# Patient Record
Sex: FEMALE | Race: OTHER | Employment: FULL TIME | URBAN - METROPOLITAN AREA
[De-identification: names, ages, dates, MRNs, and addresses within clinical notes are randomized per-mention and may not be internally consistent; named-entity substitution may affect disease eponyms.]

---

## 2018-12-21 ENCOUNTER — OFFICE VISIT (OUTPATIENT)
Dept: INTERNAL MEDICINE CLINIC | Facility: CLINIC | Age: 43
End: 2018-12-21
Payer: COMMERCIAL

## 2018-12-21 VITALS
RESPIRATION RATE: 16 BRPM | DIASTOLIC BLOOD PRESSURE: 71 MMHG | HEIGHT: 66 IN | HEART RATE: 73 BPM | SYSTOLIC BLOOD PRESSURE: 109 MMHG | WEIGHT: 147 LBS | BODY MASS INDEX: 23.63 KG/M2

## 2018-12-21 DIAGNOSIS — Z12.31 VISIT FOR SCREENING MAMMOGRAM: ICD-10-CM

## 2018-12-21 DIAGNOSIS — M54.12 CERVICAL RADICULOPATHY: ICD-10-CM

## 2018-12-21 DIAGNOSIS — E55.9 VITAMIN D DEFICIENCY: ICD-10-CM

## 2018-12-21 DIAGNOSIS — Z00.00 ENCOUNTER FOR MEDICAL EXAMINATION TO ESTABLISH CARE: ICD-10-CM

## 2018-12-21 DIAGNOSIS — Z00.00 ROUTINE PHYSICAL EXAMINATION: ICD-10-CM

## 2018-12-21 DIAGNOSIS — D56.3 THALASSEMIA TRAIT: Primary | ICD-10-CM

## 2018-12-21 DIAGNOSIS — D64.9 ANEMIA, UNSPECIFIED TYPE: ICD-10-CM

## 2018-12-21 PROCEDURE — 99205 OFFICE O/P NEW HI 60 MIN: CPT | Performed by: INTERNAL MEDICINE

## 2018-12-21 PROCEDURE — 99212 OFFICE O/P EST SF 10 MIN: CPT | Performed by: INTERNAL MEDICINE

## 2018-12-21 RX ORDER — LORATADINE 10 MG/1
10 TABLET ORAL DAILY
COMMUNITY
End: 2022-01-19

## 2018-12-21 RX ORDER — CHOLECALCIFEROL (VITAMIN D3) 50 MCG
TABLET ORAL
COMMUNITY

## 2018-12-21 NOTE — ASSESSMENT & PLAN NOTE
Anemia– due to iron deficiency from menstrual losses as well as thalassemia trait. Has been stable without any significant problems. Takes iron supplements on an as-needed basis. Recheck labs ordered .

## 2018-12-21 NOTE — ASSESSMENT & PLAN NOTE
History of herniated disks C5-6, C6-7. History of paresthesia bilateral upper limbs, radicular pain into the arms, neck and posterior head. Patient has been managed on steroid taper packs, tramadol and Norco as needed for flareups.   Has had about 2-3 fla

## 2018-12-21 NOTE — ASSESSMENT & PLAN NOTE
New patient, transfer of care from [facility].  Records being requested today. Immunization records pending. Pap smear done about a year back–records pending. History of cervical radiculopathy from large herniated disc–5 6/6 7.   Old records being re

## 2018-12-21 NOTE — PROGRESS NOTES
HPI:    Patient ID: Ardine Seip is a 44year old female. New pt  PCP  Dr Philip Christensen  Diagnosed with cervical disc [id number]  Managed. -  Off and on flare ups treated with steroid packs-usually with exercise. Last neck MRI -1-2 yrs. mahesh 12/21/18  1554   BP: 109/71   Pulse: 73   Resp: 16     Body mass index is 23.73 kg/m². PHYSICAL EXAM:   Physical Exam   Constitutional: She is oriented to person, place, and time. She appears well-developed and well-nourished.    HENT:   Right Ear: Exter deficiency from menstrual losses as well as thalassemia trait. Has been stable without any significant problems. Takes iron supplements on an as-needed basis. Recheck labs ordered .          Relevant Medications    Multiple Vitamin (MULTI-VITAMIN) Oral T SCREENING BILAT (CPT=77067)          Return in about 6 weeks (around 2/1/2019), or if symptoms worsen or fail to improve.     PT UNDERSTANDS AND AGREES TO FOLLOW DIRECTIONS AND ADVICE    Orders Placed This Encounter      CBC W Differential W Platelet [E]

## 2018-12-21 NOTE — PATIENT INSTRUCTIONS
Problem List Items Addressed This Visit        Unprioritized    Anemia     Anemia– due to iron deficiency from menstrual losses as well as thalassemia trait. Has been stable without any significant problems. Takes iron supplements on an as-needed basis. URINALYSIS, ROUTINE    VITAMIN B12    VITAMIN D, 25-HYDROXY    Visit for screening mammogram        Relevant Orders    AZRA SCREENING BILAT (CPT=77067)

## 2019-01-09 ENCOUNTER — TELEPHONE (OUTPATIENT)
Dept: INTERNAL MEDICINE CLINIC | Facility: CLINIC | Age: 44
End: 2019-01-09

## 2019-01-09 NOTE — TELEPHONE ENCOUNTER
Left message for patient that we do not have this medication on her list.  Suggested that she call back to schedule an office visit to be evaluated for her back pain.

## 2019-01-10 NOTE — TELEPHONE ENCOUNTER
Pt called in requesting to come in this week to discuss medications. [facility] is it ok to book pt in the RN approval slot at 10:30 am on Saturday 1/11?     Please reply to pool: FAIZA Mckinley

## 2019-11-29 ENCOUNTER — TELEPHONE (OUTPATIENT)
Dept: INTERNAL MEDICINE CLINIC | Facility: CLINIC | Age: 44
End: 2019-11-29

## 2019-11-29 DIAGNOSIS — R92.8 ABNORMALITY OF LEFT BREAST ON SCREENING MAMMOGRAM: Primary | ICD-10-CM

## 2019-11-29 NOTE — TELEPHONE ENCOUNTER
Collette, RN from [employer] she's been faxing an order request regarding patient's abnormal mammogram screening, three times since 10/31. Patient need this mammogram order done as soon as possible.     Amado Gale has been faxing to [phone] - i

## 2019-11-29 NOTE — TELEPHONE ENCOUNTER
The order for additional views has been generated left breast-please fax to her,please check if that is the correct one and send

## 2019-12-02 NOTE — TELEPHONE ENCOUNTER
Collette calling to confirm it is Left breast that she needs an order for. Please fax order to her at [phone].

## 2019-12-02 NOTE — TELEPHONE ENCOUNTER
Left message for Collette confirming that it is the left breast.  Once we know it is left we can fax order over to her.

## 2019-12-17 ENCOUNTER — TELEPHONE (OUTPATIENT)
Dept: INTERNAL MEDICINE CLINIC | Facility: CLINIC | Age: 44
End: 2019-12-17

## 2019-12-17 NOTE — TELEPHONE ENCOUNTER
Patient is calling because she faxed over a physical form yesterday to be completed. I checked with [facility]  and they did not receive anything. Patient is going to refax over it again today.  But she will like a call back once form is re

## 2019-12-17 NOTE — TELEPHONE ENCOUNTER
Received faxed Confirmation of Health Status form from patient. Please complete and phone patient once complete. Thank you.

## 2019-12-18 NOTE — TELEPHONE ENCOUNTER
Form placed on [facility]'s [facility] desk for review/signature. ([facility] - please note - form is asking for date of last px which was 12/21/18).

## 2019-12-18 NOTE — TELEPHONE ENCOUNTER
Jian Estrella, [id number] - notifying pt that forms have been completed/signed. Need to ask pt whether she would like to p/u originals or whether she would like us to fax them somewhere.      (Copy of form sent to be scanned in as well)

## 2019-12-20 ENCOUNTER — TELEPHONE (OUTPATIENT)
Dept: INTERNAL MEDICINE CLINIC | Facility: CLINIC | Age: 44
End: 2019-12-20

## 2019-12-21 NOTE — TELEPHONE ENCOUNTER
Note      [facility], [id number] - notifying pt that forms have been completed/signed.  Need to ask pt whether she would like to p/u originals or whether she would like us to fax them somewhere.      (Copy of form sent to be scanned in as well)

## 2020-02-07 ENCOUNTER — TELEPHONE (OUTPATIENT)
Dept: INTERNAL MEDICINE CLINIC | Facility: CLINIC | Age: 45
End: 2020-02-07

## 2020-02-07 NOTE — TELEPHONE ENCOUNTER
Spoke with [facility] pharmacy they did not receive the rx sent on 1/9/20; verbal order given.

## 2020-02-07 NOTE — TELEPHONE ENCOUNTER
[facility] pharmacy called requesting a refill for medication below.        DULoxetine HCl (CYMBALTA) 20 MG Oral Cap DR Particles 90 capsule

## (undated) NOTE — LETTER
01/22/19        Juvenal Canada  [address]      Dear Mando Doyle,    [address] [facility] records indicate that you have outstanding lab work and or testing that was ordered for you and has not yet been completed:  Orders Placed This Encounter      CBC W Dif